# Patient Record
Sex: MALE | Race: WHITE | NOT HISPANIC OR LATINO | URBAN - METROPOLITAN AREA
[De-identification: names, ages, dates, MRNs, and addresses within clinical notes are randomized per-mention and may not be internally consistent; named-entity substitution may affect disease eponyms.]

---

## 2020-08-24 ENCOUNTER — EMERGENCY (EMERGENCY)
Facility: HOSPITAL | Age: 35
LOS: 1 days | Discharge: HOME | End: 2020-08-24
Attending: EMERGENCY MEDICINE | Admitting: EMERGENCY MEDICINE
Payer: SELF-PAY

## 2020-08-24 VITALS
SYSTOLIC BLOOD PRESSURE: 129 MMHG | TEMPERATURE: 98 F | RESPIRATION RATE: 18 BRPM | OXYGEN SATURATION: 97 % | DIASTOLIC BLOOD PRESSURE: 85 MMHG | HEART RATE: 91 BPM

## 2020-08-24 VITALS
SYSTOLIC BLOOD PRESSURE: 161 MMHG | HEART RATE: 92 BPM | TEMPERATURE: 99 F | OXYGEN SATURATION: 100 % | RESPIRATION RATE: 18 BRPM | DIASTOLIC BLOOD PRESSURE: 98 MMHG

## 2020-08-24 PROCEDURE — 71045 X-RAY EXAM CHEST 1 VIEW: CPT | Mod: 26

## 2020-08-24 PROCEDURE — 99283 EMERGENCY DEPT VISIT LOW MDM: CPT

## 2020-08-24 PROCEDURE — 73030 X-RAY EXAM OF SHOULDER: CPT | Mod: 26,LT

## 2020-08-24 RX ORDER — IBUPROFEN 200 MG
600 TABLET ORAL ONCE
Refills: 0 | Status: COMPLETED | OUTPATIENT
Start: 2020-08-24 | End: 2020-08-24

## 2020-08-24 RX ORDER — IBUPROFEN 200 MG
1 TABLET ORAL
Qty: 30 | Refills: 0
Start: 2020-08-24

## 2020-08-24 RX ORDER — OXYCODONE AND ACETAMINOPHEN 5; 325 MG/1; MG/1
1 TABLET ORAL ONCE
Refills: 0 | Status: DISCONTINUED | OUTPATIENT
Start: 2020-08-24 | End: 2020-08-24

## 2020-08-24 RX ORDER — OXYCODONE AND ACETAMINOPHEN 5; 325 MG/1; MG/1
1 TABLET ORAL
Qty: 9 | Refills: 0
Start: 2020-08-24

## 2020-08-24 RX ADMIN — OXYCODONE AND ACETAMINOPHEN 1 TABLET(S): 5; 325 TABLET ORAL at 17:17

## 2020-08-24 RX ADMIN — Medication 600 MILLIGRAM(S): at 17:17

## 2020-08-24 NOTE — ED PROVIDER NOTE - ENMT, MLM
Airway patent, Nasal mucosa clear. Mouth with normal mucosa. Throat has no vesicles, no oropharyngeal exudates and uvula is midline. laceration to left head
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
## 2020-08-24 NOTE — ED PROVIDER NOTE - PATIENT PORTAL LINK FT
You can access the FollowMyHealth Patient Portal offered by [facility] by registering at the following website: [email or web address]. By joining [employer]’s FollowMyHealth portal, you will also be able to view your health information using other applications (apps) compatible with our system.
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
## 2021-04-05 ENCOUNTER — APPOINTMENT (OUTPATIENT)
Dept: PULMONOLOGY | Facility: CLINIC | Age: 36
End: 2021-04-05
Payer: COMMERCIAL

## 2021-04-05 ENCOUNTER — NON-APPOINTMENT (OUTPATIENT)
Age: 36
End: 2021-04-05

## 2021-04-05 VITALS
WEIGHT: 250 LBS | TEMPERATURE: 98.1 F | HEART RATE: 76 BPM | OXYGEN SATURATION: 96 % | BODY MASS INDEX: 33.86 KG/M2 | DIASTOLIC BLOOD PRESSURE: 75 MMHG | SYSTOLIC BLOOD PRESSURE: 121 MMHG | HEIGHT: 72 IN

## 2021-04-05 DIAGNOSIS — J20.9 ACUTE BRONCHITIS, UNSPECIFIED: ICD-10-CM

## 2021-04-05 DIAGNOSIS — Z00.00 ENCOUNTER FOR GENERAL ADULT MEDICAL EXAMINATION W/OUT ABNORMAL FINDINGS: ICD-10-CM

## 2021-04-05 DIAGNOSIS — R05 COUGH: ICD-10-CM

## 2021-04-05 PROBLEM — Z78.9 OTHER SPECIFIED HEALTH STATUS: Chronic | Status: ACTIVE | Noted: 2020-08-24

## 2021-04-05 PROCEDURE — 94010 BREATHING CAPACITY TEST: CPT

## 2021-04-05 PROCEDURE — 95012 NITRIC OXIDE EXP GAS DETER: CPT

## 2021-04-05 PROCEDURE — ZZZZZ: CPT

## 2021-04-05 PROCEDURE — 99204 OFFICE O/P NEW MOD 45 MIN: CPT | Mod: 25

## 2021-04-05 PROCEDURE — 99072 ADDL SUPL MATRL&STAF TM PHE: CPT

## 2021-04-05 RX ORDER — AZITHROMYCIN DIHYDRATE 500 MG/1
500 TABLET, FILM COATED ORAL
Refills: 0 | Status: ACTIVE | COMMUNITY

## 2021-04-05 RX ORDER — PROMETHAZINE HYDROCHLORIDE AND DEXTROMETHORPHAN HYDROBROMIDE ORAL SOLUTION 15; 6.25 MG/5ML; MG/5ML
6.25-15 SOLUTION ORAL EVERY 6 HOURS
Qty: 300 | Refills: 0 | Status: ACTIVE | COMMUNITY
Start: 2021-04-05 | End: 1900-01-01

## 2021-04-05 NOTE — PHYSICAL EXAM
[No Acute Distress] : no acute distress [Normal Oropharynx] : normal oropharynx [Normal Appearance] : normal appearance [No Neck Mass] : no neck mass [Normal Rate/Rhythm] : normal rate/rhythm [Normal S1, S2] : normal s1, s2 [No Murmurs] : no murmurs [Wheeze] : wheeze [No Abnormalities] : no abnormalities [Benign] : benign [Normal Gait] : normal gait [No Clubbing] : no clubbing [No Cyanosis] : no cyanosis [No Edema] : no edema [FROM] : FROM [Normal Color/ Pigmentation] : normal color/ pigmentation [No Focal Deficits] : no focal deficits [Oriented x3] : oriented x3 [Normal Affect] : normal affect [TextBox_68] : Diffuse wheezing noted

## 2021-04-05 NOTE — ASSESSMENT
[FreeTextEntry1] : Cough and wheezing associated with negative chest x-ray and normal spirometry.  Has remote history of childhood asthma but PFTs do not support an asthma diagnosis.  Suspect this is an acute bronchitis which should resolve with time and cough suppressants.  Would not give additional steroids.

## 2021-04-05 NOTE — HISTORY OF PRESENT ILLNESS
[Never] : never [TextBox_4] : cough and wheezing\par took steroids and antibiotics\par CXR done this AM reported as negative.  Reports history of childhood asthma but no asthma episodes since that time.  Non-smoker denies occupational exposure to dust fumes or toxins.\par \par

## 2021-12-03 ENCOUNTER — EMERGENCY (EMERGENCY)
Facility: HOSPITAL | Age: 36
LOS: 1 days | Discharge: HOME | End: 2021-12-03
Attending: STUDENT IN AN ORGANIZED HEALTH CARE EDUCATION/TRAINING PROGRAM
Payer: COMMERCIAL

## 2021-12-03 PROCEDURE — 26770 TREAT FINGER DISLOCATION: CPT | Mod: 54

## 2021-12-03 PROCEDURE — 99284 EMERGENCY DEPT VISIT MOD MDM: CPT | Mod: 57

## 2021-12-04 VITALS
OXYGEN SATURATION: 100 % | HEART RATE: 68 BPM | TEMPERATURE: 98 F | SYSTOLIC BLOOD PRESSURE: 123 MMHG | RESPIRATION RATE: 16 BRPM | WEIGHT: 244.93 LBS | HEIGHT: 72 IN | DIASTOLIC BLOOD PRESSURE: 73 MMHG

## 2021-12-04 PROCEDURE — 73130 X-RAY EXAM OF HAND: CPT | Mod: 26,LT

## 2021-12-04 PROCEDURE — 73130 X-RAY EXAM OF HAND: CPT

## 2021-12-04 PROCEDURE — 99285 EMERGENCY DEPT VISIT HI MDM: CPT | Mod: 25

## 2021-12-04 PROCEDURE — 26770 TREAT FINGER DISLOCATION: CPT | Mod: LT

## 2021-12-04 NOTE — ED PROVIDER NOTE - OBJECTIVE STATEMENT
36 y.o presenting with pain to left 4th digit s/p fall today. denies other area of injury. endorses that he is unable to extend his finger. denies numbness, tingling, weakness

## 2021-12-04 NOTE — ED PROVIDER NOTE - PROGRESS NOTE DETAILS
Patient finger reduced, placed in splint, neurovascularly intact. given hand f.u, return precaution instructed.

## 2021-12-04 NOTE — ED ADULT NURSE NOTE - HIV OFFER
Patient called to confirm appointment.      CLINICAL PHARMACY CONSULT: MED RECONCILIATION/REVIEW ADDENDUM    For Pharmacy Admin Tracking Only    PHSO: No  Total # of Interventions Recommended: 0  Total Interventions Accepted:   Time Spent (min): 5    Duane Lugo PharmD Opt out

## 2021-12-04 NOTE — ED PROVIDER NOTE - PATIENT PORTAL LINK FT
You can access the FollowMyHealth Patient Portal offered by [facility] by registering at the following website: [email or web address]. By joining [employer]’s FollowMyHealth portal, you will also be able to view your health information using other applications (apps) compatible with our system.

## 2022-04-01 ENCOUNTER — EMERGENCY (EMERGENCY)
Facility: HOSPITAL | Age: 37
LOS: 1 days | Discharge: HOME | End: 2022-04-01
Attending: EMERGENCY MEDICINE
Payer: COMMERCIAL

## 2022-04-01 VITALS
RESPIRATION RATE: 17 BRPM | SYSTOLIC BLOOD PRESSURE: 150 MMHG | HEART RATE: 76 BPM | TEMPERATURE: 98 F | HEIGHT: 72 IN | WEIGHT: 244.93 LBS | DIASTOLIC BLOOD PRESSURE: 85 MMHG | OXYGEN SATURATION: 98 %

## 2022-04-01 VITALS
SYSTOLIC BLOOD PRESSURE: 152 MMHG | RESPIRATION RATE: 18 BRPM | OXYGEN SATURATION: 97 % | HEART RATE: 75 BPM | TEMPERATURE: 98 F | DIASTOLIC BLOOD PRESSURE: 93 MMHG

## 2022-04-01 PROCEDURE — 73060 X-RAY EXAM OF HUMERUS: CPT | Mod: 26,RT

## 2022-04-01 PROCEDURE — 99284 EMERGENCY DEPT VISIT MOD MDM: CPT | Mod: 25

## 2022-04-01 PROCEDURE — 73060 X-RAY EXAM OF HUMERUS: CPT

## 2022-04-01 PROCEDURE — 73090 X-RAY EXAM OF FOREARM: CPT

## 2022-04-01 PROCEDURE — 99284 EMERGENCY DEPT VISIT MOD MDM: CPT

## 2022-04-01 PROCEDURE — 73090 X-RAY EXAM OF FOREARM: CPT | Mod: 26,LT

## 2022-04-01 RX ORDER — ACETAMINOPHEN 500 MG
975 TABLET ORAL ONCE
Refills: 0 | Status: COMPLETED | OUTPATIENT
Start: 2022-04-01 | End: 2022-04-01

## 2022-04-01 RX ADMIN — Medication 975 MILLIGRAM(S): at 11:02

## 2022-04-01 NOTE — ED PROVIDER NOTE - NSFOLLOWUPINSTRUCTIONS_ED_ALL_ED_FT
Keep sling in place at all times.  Take 975mg of Tylenol every 6hrs and 600mg Ibuprofen (Advil/Motrin) every 8 hrs as needed for pains/fevers.  Drink plenty of fluids.  Follow up with Orthopedist or in the Clinic as discussed within 1 week.  Return to the ER for any concerns.

## 2022-04-01 NOTE — ED PROVIDER NOTE - NSFOLLOWUPCLINICS_GEN_ALL_ED_FT
[facility]  Orthopedics  [address] [place] [address]  Phone: [phone]  Fax: [phone]
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
## 2022-04-01 NOTE — ED PROVIDER NOTE - CARE PROVIDER_API CALL
Edson Abreu)  Orthopaedic Surgery; Sports Medicine  [address] [place] [address], [address] [place] [address]  Phone: [phone]  Fax: [phone]  Follow Up Time:

## 2022-04-01 NOTE — ED PROVIDER NOTE - PATIENT PORTAL LINK FT
You can access the FollowMyHealth Patient Portal offered by [facility] by registering at the following website: [email or web address]. By joining [employer]’s FollowMyHealth portal, you will also be able to view your health information using other applications (apps) compatible with our system.

## 2024-03-04 ENCOUNTER — EMERGENCY (EMERGENCY)
Facility: HOSPITAL | Age: 39
LOS: 1 days | Discharge: HOME | End: 2024-03-04
Attending: EMERGENCY MEDICINE
Payer: COMMERCIAL

## 2024-03-04 VITALS
SYSTOLIC BLOOD PRESSURE: 144 MMHG | DIASTOLIC BLOOD PRESSURE: 84 MMHG | WEIGHT: 220.02 LBS | HEIGHT: 71 IN | HEART RATE: 80 BPM | RESPIRATION RATE: 17 BRPM | TEMPERATURE: 98 F | OXYGEN SATURATION: 98 %

## 2024-03-04 VITALS
TEMPERATURE: 99 F | OXYGEN SATURATION: 98 % | SYSTOLIC BLOOD PRESSURE: 136 MMHG | DIASTOLIC BLOOD PRESSURE: 75 MMHG | HEART RATE: 84 BPM | RESPIRATION RATE: 17 BRPM

## 2024-03-04 LAB
ALBUMIN SERPL ELPH-MCNC: 4 G/DL — SIGNIFICANT CHANGE UP (ref 3.3–5)
ALP SERPL-CCNC: 56 U/L — SIGNIFICANT CHANGE UP (ref 40–120)
ALT FLD-CCNC: 23 U/L — SIGNIFICANT CHANGE UP (ref 10–45)
ANION GAP SERPL CALC-SCNC: 10 MMOL/L — SIGNIFICANT CHANGE UP (ref 5–17)
APTT BLD: 28.4 SEC — SIGNIFICANT CHANGE UP (ref 24.5–35.6)
AST SERPL-CCNC: 25 U/L — SIGNIFICANT CHANGE UP (ref 10–40)
BASE EXCESS BLDV CALC-SCNC: -2.3 MMOL/L — LOW (ref -2–3)
BASOPHILS # BLD AUTO: 0.04 K/UL — SIGNIFICANT CHANGE UP (ref 0–0.2)
BASOPHILS NFR BLD AUTO: 0.3 % — SIGNIFICANT CHANGE UP (ref 0–2)
BILIRUB SERPL-MCNC: 0.7 MG/DL — SIGNIFICANT CHANGE UP (ref 0.2–1.2)
BUN SERPL-MCNC: 19 MG/DL — SIGNIFICANT CHANGE UP (ref 7–23)
CA-I SERPL-SCNC: 1.17 MMOL/L — SIGNIFICANT CHANGE UP (ref 1.15–1.33)
CALCIUM SERPL-MCNC: 8.8 MG/DL — SIGNIFICANT CHANGE UP (ref 8.4–10.5)
CHLORIDE BLDV-SCNC: 102 MMOL/L — SIGNIFICANT CHANGE UP (ref 96–108)
CHLORIDE SERPL-SCNC: 104 MMOL/L — SIGNIFICANT CHANGE UP (ref 96–108)
CO2 BLDV-SCNC: 26 MMOL/L — SIGNIFICANT CHANGE UP (ref 22–26)
CO2 SERPL-SCNC: 22 MMOL/L — SIGNIFICANT CHANGE UP (ref 22–31)
CREAT SERPL-MCNC: 0.78 MG/DL — SIGNIFICANT CHANGE UP (ref 0.5–1.3)
EGFR: 116 ML/MIN/1.73M2 — SIGNIFICANT CHANGE UP
EOSINOPHIL # BLD AUTO: 0.16 K/UL — SIGNIFICANT CHANGE UP (ref 0–0.5)
EOSINOPHIL NFR BLD AUTO: 1.2 % — SIGNIFICANT CHANGE UP (ref 0–6)
GAS PNL BLDV: 133 MMOL/L — LOW (ref 136–145)
GAS PNL BLDV: SIGNIFICANT CHANGE UP
GLUCOSE BLDV-MCNC: 100 MG/DL — HIGH (ref 70–99)
GLUCOSE SERPL-MCNC: 101 MG/DL — HIGH (ref 70–99)
HCO3 BLDV-SCNC: 25 MMOL/L — SIGNIFICANT CHANGE UP (ref 22–29)
HCT VFR BLD CALC: 42 % — SIGNIFICANT CHANGE UP (ref 39–50)
HCT VFR BLDA CALC: 52 % — HIGH (ref 39–51)
HGB BLD CALC-MCNC: 17.3 G/DL — SIGNIFICANT CHANGE UP (ref 12.6–17.4)
HGB BLD-MCNC: 15 G/DL — SIGNIFICANT CHANGE UP (ref 13–17)
IMM GRANULOCYTES NFR BLD AUTO: 0.4 % — SIGNIFICANT CHANGE UP (ref 0–0.9)
INR BLD: 1 RATIO — SIGNIFICANT CHANGE UP (ref 0.85–1.18)
LACTATE BLDV-MCNC: 1.9 MMOL/L — SIGNIFICANT CHANGE UP (ref 0.5–2)
LIDOCAIN IGE QN: 22 U/L — SIGNIFICANT CHANGE UP (ref 7–60)
LYMPHOCYTES # BLD AUTO: 0.76 K/UL — LOW (ref 1–3.3)
LYMPHOCYTES # BLD AUTO: 5.5 % — LOW (ref 13–44)
MCHC RBC-ENTMCNC: 30.2 PG — SIGNIFICANT CHANGE UP (ref 27–34)
MCHC RBC-ENTMCNC: 35.7 GM/DL — SIGNIFICANT CHANGE UP (ref 32–36)
MCV RBC AUTO: 84.5 FL — SIGNIFICANT CHANGE UP (ref 80–100)
MONOCYTES # BLD AUTO: 0.73 K/UL — SIGNIFICANT CHANGE UP (ref 0–0.9)
MONOCYTES NFR BLD AUTO: 5.3 % — SIGNIFICANT CHANGE UP (ref 2–14)
NEUTROPHILS # BLD AUTO: 12.03 K/UL — HIGH (ref 1.8–7.4)
NEUTROPHILS NFR BLD AUTO: 87.3 % — HIGH (ref 43–77)
NRBC # BLD: 0 /100 WBCS — SIGNIFICANT CHANGE UP (ref 0–0)
PCO2 BLDV: 49 MMHG — SIGNIFICANT CHANGE UP (ref 42–55)
PH BLDV: 7.31 — LOW (ref 7.32–7.43)
PLATELET # BLD AUTO: 257 K/UL — SIGNIFICANT CHANGE UP (ref 150–400)
PO2 BLDV: 35 MMHG — SIGNIFICANT CHANGE UP (ref 25–45)
POTASSIUM BLDV-SCNC: 3.9 MMOL/L — SIGNIFICANT CHANGE UP (ref 3.5–5.1)
POTASSIUM SERPL-MCNC: 3.9 MMOL/L — SIGNIFICANT CHANGE UP (ref 3.5–5.3)
POTASSIUM SERPL-SCNC: 3.9 MMOL/L — SIGNIFICANT CHANGE UP (ref 3.5–5.3)
PROT SERPL-MCNC: 6.8 G/DL — SIGNIFICANT CHANGE UP (ref 6–8.3)
PROTHROM AB SERPL-ACNC: 10.5 SEC — SIGNIFICANT CHANGE UP (ref 9.5–13)
RBC # BLD: 4.97 M/UL — SIGNIFICANT CHANGE UP (ref 4.2–5.8)
RBC # FLD: 12 % — SIGNIFICANT CHANGE UP (ref 10.3–14.5)
SAO2 % BLDV: 56.6 % — LOW (ref 67–88)
SODIUM SERPL-SCNC: 136 MMOL/L — SIGNIFICANT CHANGE UP (ref 135–145)
WBC # BLD: 13.78 K/UL — HIGH (ref 3.8–10.5)
WBC # FLD AUTO: 13.78 K/UL — HIGH (ref 3.8–10.5)

## 2024-03-04 PROCEDURE — 96366 THER/PROPH/DIAG IV INF ADDON: CPT

## 2024-03-04 PROCEDURE — 80053 COMPREHEN METABOLIC PANEL: CPT

## 2024-03-04 PROCEDURE — 85018 HEMOGLOBIN: CPT

## 2024-03-04 PROCEDURE — 82330 ASSAY OF CALCIUM: CPT

## 2024-03-04 PROCEDURE — 82435 ASSAY OF BLOOD CHLORIDE: CPT

## 2024-03-04 PROCEDURE — 96365 THER/PROPH/DIAG IV INF INIT: CPT | Mod: XU

## 2024-03-04 PROCEDURE — 82803 BLOOD GASES ANY COMBINATION: CPT

## 2024-03-04 PROCEDURE — 71045 X-RAY EXAM CHEST 1 VIEW: CPT

## 2024-03-04 PROCEDURE — 84295 ASSAY OF SERUM SODIUM: CPT

## 2024-03-04 PROCEDURE — 82947 ASSAY GLUCOSE BLOOD QUANT: CPT

## 2024-03-04 PROCEDURE — 71275 CT ANGIOGRAPHY CHEST: CPT | Mod: 26,MC

## 2024-03-04 PROCEDURE — 71275 CT ANGIOGRAPHY CHEST: CPT | Mod: MC

## 2024-03-04 PROCEDURE — 71045 X-RAY EXAM CHEST 1 VIEW: CPT | Mod: 26

## 2024-03-04 PROCEDURE — 96375 TX/PRO/DX INJ NEW DRUG ADDON: CPT | Mod: XU

## 2024-03-04 PROCEDURE — 74177 CT ABD & PELVIS W/CONTRAST: CPT | Mod: 26,MC

## 2024-03-04 PROCEDURE — 72131 CT LUMBAR SPINE W/O DYE: CPT | Mod: 26,MC

## 2024-03-04 PROCEDURE — 84132 ASSAY OF SERUM POTASSIUM: CPT

## 2024-03-04 PROCEDURE — 85025 COMPLETE CBC W/AUTO DIFF WBC: CPT

## 2024-03-04 PROCEDURE — 85014 HEMATOCRIT: CPT

## 2024-03-04 PROCEDURE — 83605 ASSAY OF LACTIC ACID: CPT

## 2024-03-04 PROCEDURE — 36415 COLL VENOUS BLD VENIPUNCTURE: CPT

## 2024-03-04 PROCEDURE — 83690 ASSAY OF LIPASE: CPT

## 2024-03-04 PROCEDURE — 85610 PROTHROMBIN TIME: CPT

## 2024-03-04 PROCEDURE — 74177 CT ABD & PELVIS W/CONTRAST: CPT | Mod: MC

## 2024-03-04 PROCEDURE — 99285 EMERGENCY DEPT VISIT HI MDM: CPT

## 2024-03-04 PROCEDURE — 99284 EMERGENCY DEPT VISIT MOD MDM: CPT | Mod: 25

## 2024-03-04 PROCEDURE — 85730 THROMBOPLASTIN TIME PARTIAL: CPT

## 2024-03-04 RX ORDER — CYCLOBENZAPRINE HYDROCHLORIDE 10 MG/1
1 TABLET, FILM COATED ORAL
Qty: 15 | Refills: 0
Start: 2024-03-04

## 2024-03-04 RX ORDER — ACETAMINOPHEN 500 MG
1000 TABLET ORAL ONCE
Refills: 0 | Status: COMPLETED | OUTPATIENT
Start: 2024-03-04 | End: 2024-03-04

## 2024-03-04 RX ORDER — CYCLOBENZAPRINE HYDROCHLORIDE 10 MG/1
10 TABLET, FILM COATED ORAL ONCE
Refills: 0 | Status: COMPLETED | OUTPATIENT
Start: 2024-03-04 | End: 2024-03-04

## 2024-03-04 RX ORDER — ONDANSETRON 8 MG/1
4 TABLET, FILM COATED ORAL ONCE
Refills: 0 | Status: COMPLETED | OUTPATIENT
Start: 2024-03-04 | End: 2024-03-04

## 2024-03-04 RX ORDER — SODIUM CHLORIDE 9 MG/ML
1000 INJECTION INTRAMUSCULAR; INTRAVENOUS; SUBCUTANEOUS ONCE
Refills: 0 | Status: COMPLETED | OUTPATIENT
Start: 2024-03-04 | End: 2024-03-04

## 2024-03-04 RX ORDER — LIDOCAINE 4 G/100G
1 CREAM TOPICAL ONCE
Refills: 0 | Status: COMPLETED | OUTPATIENT
Start: 2024-03-04 | End: 2024-03-04

## 2024-03-04 RX ORDER — IBUPROFEN 200 MG
600 TABLET ORAL ONCE
Refills: 0 | Status: COMPLETED | OUTPATIENT
Start: 2024-03-04 | End: 2024-03-04

## 2024-03-04 RX ADMIN — Medication 1000 MILLIGRAM(S): at 14:11

## 2024-03-04 RX ADMIN — Medication 400 MILLIGRAM(S): at 12:33

## 2024-03-04 RX ADMIN — CYCLOBENZAPRINE HYDROCHLORIDE 10 MILLIGRAM(S): 10 TABLET, FILM COATED ORAL at 16:50

## 2024-03-04 RX ADMIN — Medication 600 MILLIGRAM(S): at 16:51

## 2024-03-04 RX ADMIN — ONDANSETRON 4 MILLIGRAM(S): 8 TABLET, FILM COATED ORAL at 12:33

## 2024-03-04 RX ADMIN — SODIUM CHLORIDE 1000 MILLILITER(S): 9 INJECTION INTRAMUSCULAR; INTRAVENOUS; SUBCUTANEOUS at 12:34

## 2024-03-04 RX ADMIN — LIDOCAINE 1 PATCH: 4 CREAM TOPICAL at 16:56

## 2024-03-04 NOTE — ED PROVIDER NOTE - CLINICAL SUMMARY MEDICAL DECISION MAKING FREE TEXT BOX
The patient currently reports abdominal and back pain. Based upon the history and exam, the patient requires CT angiogram imaging of the chest, abdomen, and pelvis. They will continue to be monitored with any changes to clinical plan documented in progress notes. The patient is currently reporting pain that will be addressed with appropriate medication & therapeutics and will require reassessment.

## 2024-03-04 NOTE — ED PROVIDER NOTE - PHYSICAL EXAMINATION
T(F): 97.8; HR: 80; BP: 144/84; RR: 17; SpO2: 98%    General: uncomfortable; alert, oriented to person, time, place  Psych: mood appropriate  Head: normocephalic; atraumatic  Eyes: conjunctivae clear bilaterally, sclerae anicteric  ENT: no nasal flaring, patent nares  Cardio: non-tachycardic; skin warm and well perfused  Resp: normal respiratory effort; no accessory muscle use  GI: guarding, tender with light palpation diffusely  Neuro: normal sensation, moving all four extremities equally  Skin: No evidence of rash or bruising  MSK: midline lumbar tenderness to palpation  Lymph/Vasc: no LE edema

## 2024-03-04 NOTE — ED ADULT NURSE NOTE - OBJECTIVE STATEMENT
Patient presents to Ed via amb with c/o back pain. Patient reports experiencing onset of severe back pain at 3am today.   Per Ems Morphine 5mg and Zofran 4mg given en route via 18g LAC iv lock. Patient presents to Ed via amb with c/o back pain. Patient reports experiencing onset of severe back pain at 3am today.   Per Ems Morphine 5mg and Zofran 4mg given en route via 18g LAC iv lock. Patient A&Ox3 denies chest pain or sob no   nausea vomiting cough fever chills headache or dizziness denies urinary abnormal symptoms. Patient awaiting disposition  comfort measures provided. Patient presents to Ed via amb with c/o back pain. Patient reports experiencing onset of severe back pain at 3am today.   Per Ems Morphine 5mg and Zofran 4mg given en route via 18g LAC iv lock. Patient A&Ox3 denies chest pain or sob no   nausea vomiting cough fever chills headache or dizziness denies urinary abnormal symptoms. Patient awaiting disposition  comfort measures provided.  Addendum to previous note patient c/o nausea on assessment antiemetic given.

## 2024-03-04 NOTE — ED PROVIDER NOTE - NSFOLLOWUPINSTRUCTIONS_ED_ALL_ED_FT
You were seen in the emergency department for back pain. We have evaluated you and determined that you do not require further hospital interventions.    During your stay you had the following relevant results: a CT scan showing "L4-5 suspected broad-based disc bulge with suspected impingement of exiting left L4 nerve root. Moderate right-sided neural foraminal stenosis"    Please follow up with a SPINE DOCTOR to discuss the results of your stay in our department.    You have been sent one or more prescriptions to your pharmacy. The dosing and frequency are included in this paperwork. Please take each medication as instructed.    If you start to experience worsening symptoms such as inability to walk, fevers or chills, severe pain, please return to the emergency department for further evaluation.

## 2024-03-04 NOTE — ED ADULT TRIAGE NOTE - WEIGHT METHOD
Ambulatory Care Coordination Note  1/6/2022  CM Risk Score: 0  Charlson 10 Year Mortality Risk Score: 2%     ACC: Dwayne Corona RN    Summary Note: Called patient's mother to follow up on progress, reinforce previous education/ provide pt education, and discuss any new issues or concerns. HIPAA compliant message left requesting a return phone call at patients convenience to discuss. Will continue to follow. Care Coordination Interventions    Program Enrollment: Rising Risk  Referral from Primary Care Provider: No  Suggested Interventions and Community Resources  Behavorial Health: In Process         Goals Addressed    None         Prior to Admission medications    Medication Sig Start Date End Date Taking? Authorizing Provider   FLUoxetine (PROZAC) 20 MG capsule  9/28/21   Historical Provider, MD   QUEtiapine (SEROQUEL) 25 MG tablet Take 50 mg by mouth nightly    Historical Provider, MD       No future appointments. stated

## 2024-03-04 NOTE — ED ADULT TRIAGE NOTE - NSWEIGHTCALCTOOLDRUG_GEN_A_CORE
used Keystone Flap Text: The defect edges were debeveled with a #15 scalpel blade.  Given the location of the defect, shape of the defect a keystone flap was deemed most appropriate.  Using a sterile surgical marker, an appropriate keystone flap was drawn incorporating the defect, outlining the appropriate donor tissue and placing the expected incisions within the relaxed skin tension lines where possible. The area thus outlined was incised deep to adipose tissue with a #15 scalpel blade.  The skin margins were undermined to an appropriate distance in all directions around the primary defect and laterally outward around the flap utilizing iris scissors.

## 2024-03-04 NOTE — ED ADULT NURSE NOTE - NSFALLRISKINTERV_ED_ALL_ED

## 2024-03-04 NOTE — ED PROVIDER NOTE - MDM ORDERS SUBMITTED SELECTION
Rx Authorization:    Requested Medication/ Dosedonepezil (ARICEPT) 5 MG tablet    Date last refill ordered: 4/2/21    Quantity ordered: 30    # refills: 2    Date of last clinic visit with ordering provider: 6/23/21    Date of next clinic visit with ordering provider:     All pertinent protocol data (lab date/result):     Include pertinent information from patients message:     
Imaging Studies/Medications

## 2024-03-04 NOTE — ED PROVIDER NOTE - NS ED MD DISPO DISCHARGE CCDA
Addended by: ROSELIA SHERWOOD on: 10/5/2021 08:02 PM     Modules accepted: Orders     Patient/Caregiver provided printed discharge information.

## 2024-03-04 NOTE — ED PROVIDER NOTE - PATIENT PORTAL LINK FT
You can access the FollowMyHealth Patient Portal offered by [facility] by registering at the following website: [email or web address]. By joining [employer]’s FollowMyHealth portal, you will also be able to view your health information using other applications (apps) compatible with our system.

## 2024-03-04 NOTE — ED PROVIDER NOTE - OBJECTIVE STATEMENT
38-year-old male with no past medical history presents to the emergency department due to acute onset back and upper abdominal pain beginning at 3 AM.  He called 911, EMS gave him 5 mg of morphine with partial effect.  He denies shortness of breath.  He has no recent injections to the back.  No recent traumatic events.  He also endorses nausea. Only surgical history is biceps rupture repair.

## 2024-03-04 NOTE — ED ADULT NURSE NOTE - NSFALLRISKASMTTYPE_ED_ALL_ED
[facility]    History and Physical - Hospitalist Service       Date of Admission:  6/21/2019    Assessment & Plan   Ras Esparza is a 60 year old male who has not seen primary care physician for routine checkup for several years and so is not aware of any medical problems who was sent to emergency room by his physician because of abnormal creatinine when he presented to his primary care provider yesterday due to worsening lower extremity edema and intermittent back pain.    Acute kidney injury  Bladder outlet obstruction with bilateral hydronephrosis and hydroureter  Elevated PSA  -Elevated creatinine likely secondary to obstruction, which has now been relieved, will get urology evaluate him, however due to the degree of renal failure we will also get nephrology evaluate him  -Monitor daily BMP, avoid any nephrotoxin, hydrate gently    Back pain  -Could be referred pain from distended bladder, however given his elevated PSA and possibility of prostate cancer with metastasis to spine will get x-ray to screen, pain management with PRN [place]    Lower extremity edema  Dyspnea on exertion  Elevated BNP  -Likely from renal failure, however given small pleural effusion, elevated BNP and worsening dyspnea we will get echocardiogram   -also will get lower extremity ultrasound to rule out DVT    Essential Hypertension   -Likely undiagnosed in the setting of not seeing a physician for several years.  Not on any medication PTA  -PRN IV hydralazine and labetalol  -Monitor while in-house, likely will start him on antihypertensive depending on his trend over the next 24 hours    Anemia  -Does complain of intermittent hemorrhoidal bleeding.  Monitor and transfuse as needed       Diet: Renal  DVT Prophylaxis: Pneumatic Compression Devices  Mayberry Catheter: in place, indication:  Bladder outlet obstruction  Code Status: Full code    Disposition Plan   Expected discharge: 2 - 3 days, recommended to prior living  arrangement once renal function improved and And cleared by all consultants.  Entered: Shantel Worthington MD 06/21/2019, 1:35 PM     The patient's care was discussed with the Bedside Nurse, Patient and Patient's Family.    Shantel Worthington MD  [facility]    ______________________________________________________________________    Chief Complaint   Lower extremity swelling, abnormal labs    History is obtained from the patient and patient's family    History of Present Illness   Ras Esparza is a 60 year old male who has not seen primary care physician for routine checkup for several years and so is not aware of any medical problems who was sent to emergency room by his physician because of abnormal creatinine when he presented to his primary care provider yesterday due to worsening lower extremity edema and intermittent back pain.When he went to his PCP yesterday mainly because his lower extremity started swelling, and labs were drawn and he was found to have a creatinine of 9.59 with a BUN of 98, proBNP 45 492, PSA 15 TSH 0.85 hemoglobin 8.7 and hematocrit 27.9.  Results came back today and so he was advised to come to the ED.     Patient reports that his symptoms have been ongoing for several months now.  He had been noticing poor streaming of urine for several months now associated with pain prior to urination which improves after he is able to void somewhat.  He denies any hematuria. Also he has noticed decreased urine quantity for last several weeks.   He was noted to have suprapubic mass when he presented to the ED, which he reports has been there for several months.  Bladder scan was done and he already has more than 3 L out after Mayberry catheter was placed.  He reports he is mass is gone right now and feels much more comfortable.  He has also been noticing bilateral lower extremity swelling for last several weeks.  He denies any pain in his legs.  He also noticed shortness of breath more than his  baseline.  He reports normally he can mow the lawn almost half of his two third of acre without stopping, however recently he has to stop every 10 minutes or so mainly for extreme fatigue and shortness of breath.  He denies any chest pain, palpitation, dizziness, lightheadedness, fever or cough.  Also he has been noticing pain in his lower back, mainly in the spine which does not radiate to his legs, however it comes to his abdomen at times.    When he presented to the ED he was found to have a blood pressure of 187/100, pulse rate 147, temperature 97.6, O2 sat 95% on room air.  Labs showed sodium of 142, potassium 4.7 chloride 110, BUN 96, creatinine 8.94 white count 6.9 hemoglobin is 8.9 hematocrit 27.7 platelet 228, UA with trace amount of leukocyte esterase and increased WBC, culture have been sent.CT abdomen and pelvis with bilateral hydronephrosis and hydroureter with no urinary tract calculi, this is likely chronic and related to bladder outlet obstruction.  Thick-walled decompressed urinary bladder with Mayberry catheter and vascular calcification.  Chest x-ray shows posterior small left pleural effusion remainder of the lungs are clear bilaterally heart size is probably normal considering AP technique     He does have history of hemorrhoidal bleeding.  He reports whenever he is constipated he has little amount of edgardo blood in the toilet paper when he wipes his bowel movement.  He also complains of poor appetite but denies nausea or vomiting.  He however reports to intentional weight loss with intermittent fasting for about 4 months.  He has lost close to 45 to 50 pounds in the last 4 to 5 months.    Review of Systems    The 10 point Review of Systems is negative other than noted in the HPI or here.     Past Medical History    I have reviewed this patient's medical history and updated it with pertinent information if needed.   Past Medical History:   Diagnosis Date     Tobacco abuse        Past Surgical  History   I have reviewed this patient's surgical history and updated it with pertinent information if needed.  No past surgical history on file.    Social History   I have reviewed this patient's social history and updated it with pertinent information if needed.  Social History     Tobacco Use     Smoking status: Former Smoker     Packs/day: 0.50     Types: Cigarettes     Last attempt to quit: 2019     Years since quittin.0     Smokeless tobacco: Never Used     Tobacco comment: 12 cigarettes per day   Substance Use Topics     Alcohol use: Not Currently     Alcohol/week: 0.0 oz     Comment: quit in 2018.      Drug use: Never       Family History   I have reviewed this patient's family history and updated it with pertinent information if needed.   Family History   Problem Relation Age of Onset     Glaucoma Mother      Throat cancer Father      Rheumatoid Arthritis Sister      Alcoholism Brother      Liver Disease Brother        Prior to Admission Medications   Prior to Admission Medications   Prescriptions Last Dose Informant Patient Reported? Taking?   ibuprofen (ADVIL/MOTRIN) 200 MG tablet PRN Self Yes Yes   Sig: Take 100-200 mg by mouth daily as needed for mild pain      Facility-Administered Medications: None     Allergies   No Known Allergies    Physical Exam   Vital Signs: Temp: 97.6  F (36.4  C) Temp src: Temporal BP: (!) 160/105 Pulse: 91   Resp: 20 SpO2: 100 % O2 Device: None (Room air)    Weight: 256 lbs 0 oz    Exam:  Constitutional: Awake, alert and no distress. Appears comfortable  Head: Normocephalic. No masses, lesions, tenderness or abnormalities  ENT: ENT exam normal, no neck nodes or sinus tenderness  Cardiovascular: RRR.  No murmurs, no rubs or JVD  Respiratory: Normal WOB,b/l equal air entry, no wheezes or crackles   Gastrointestinal: Abdomen soft, non-tender. BS normal. No masses, organomegaly  : Mayberry catheter in place with blood-tinged urine  Extremities : 3+ bilateral pitting edema  , no clubbing or cyanosis    Neurologic: Cranial nerves II-XII grossly intact , power symmetrical bilaterally, Reflexes normal and symmetric. Sensation grossly WNL.  Skin: Warm and dry to touch no rash    Data   Data reviewed today: I reviewed all medications, new labs and imaging results over the last 24 hours. I personally reviewed the abdominal CT image(s) showing Bilateral hydronephrosis and hydroureter, see below for details.    Recent Labs   Lab 06/21/19  1114 06/20/19  1651   WBC 6.9 8.0   HGB 8.9* 8.7*   MCV 93 96    227    141   POTASSIUM 4.7 5.0   CHLORIDE 110* 110*   CO2 18* 18*   BUN 96* 98*   CR 8.94* 9.59*   ANIONGAP 14 13   ELICIA 8.7 8.4*   GLC 95 100*   ALBUMIN 3.3* 3.5   PROTTOTAL 7.1 7.1   BILITOTAL 0.6 0.7   ALKPHOS 80 79   ALT 38 39   AST 15 18     Recent Results (from the past 24 hour(s))   Abd/pelvis CT no contrast - Stone Protocol    Narrative    CT ABDOMEN AND PELVIS WITHOUT CONTRAST 6/21/2019 12:06 PM    HISTORY: Elevated creatinine. Evaluate for mass or urinary retention.    TECHNIQUE: Helical axial scans from the dome of liver through the  pubic symphysis without contrast. Radiation dose for this scan was  reduced using automated exposure control, adjustment of the mA and/or  kV according to patient size, or iterative reconstruction technique.    COMPARISON: None.    FINDINGS: Small left pleural effusion with adjacent atelectatic  change. The liver, spleen, pancreas and bilateral adrenal glands  appear normal to the extent visualized without IV contrast. Vascular  calcifications are seen.    There is bilateral hydronephrosis and hydroureter. No urinary tract  calculi. Bilateral perinephric soft tissue stranding which is greater  on the left than the right and is likely related to chronic  obstruction. The bowel and mesentery in the upper abdomen are  unremarkable.    Scans through the pelvis show a Mayberry catheter in a decompressed  urinary bladder. Urinary bladder wall appears  thickened which may be  related to chronic bladder outlet obstruction. No free fluid or acute  appearing abnormality. The appendix is identified and appears normal.      Impression    IMPRESSION:  1. Bilateral hydronephrosis and hydroureter with no urinary tract  calculi. This is likely chronic and related to bladder outlet  obstruction.  2. Thick-walled decompressed urinary bladder with Mayberry catheter.  3. Vascular calcifications.   XR Chest 2 Views    Narrative    CHEST TWO VIEWS  6/21/2019 1:21 PM     HISTORY: RAMON (dyspnea on exertion).    COMPARISON: None.      Impression    IMPRESSION: Posterior small left pleural effusion. Remainder of the  lungs are clear bilaterally. Heart size is probably normal considering  AP technique.      Initial (On Arrival)

## 2024-03-04 NOTE — ED PROVIDER NOTE - PROGRESS NOTE DETAILS
Roberto Villaseñor MD: Lab and imaging results communicated to patient.  He has a herniated disc between L4 and L5.  Patient given lidocaine patch, cyclobenzaprine, Motrin.  He will be discharged with a prescription for cyclobenzaprine and a referral to spine.

## 2024-03-04 NOTE — ED PROVIDER NOTE - ATTENDING CONTRIBUTION TO CARE
39m no med hx pw back pain and chest pain since 3am  has not felt this before  given morphine in the ambulance  on exam, somnolent but responsive  consider dissection

## 2024-03-11 ENCOUNTER — APPOINTMENT (OUTPATIENT)
Dept: ORTHOPEDIC SURGERY | Facility: CLINIC | Age: 39
End: 2024-03-11

## 2024-11-05 NOTE — ED ADULT TRIAGE NOTE - NS ED TRIAGE AVPU SCALE
Alert-The patient is alert, awake and responds to voice. The patient is oriented to time, place, and person. The triage nurse is able to obtain subjective information. Detail Level: Zone Post-Care Instructions: I reviewed with the patient in detail post-care instructions. Patient is to wear sunprotection, and avoid picking at any of the treated lesions. Pt may apply Vaseline to crusted or scabbing areas. Consent: The patient's consent was obtained including but not limited to risks of crusting, scabbing, blistering, scarring, darker or lighter pigmentary change, recurrence, incomplete removal and infection. Render Post-Care Instructions In Note?: no Duration Of Freeze Thaw-Cycle (Seconds): 1 Total Number Of Aks Treated: 11 Number Of Freeze-Thaw Cycles: 2 freeze-thaw cycles Spray Paint Text: The liquid nitrogen was applied to the skin utilizing a spray paint frosting technique. Show Spray Paint Technique Variable?: Yes Number Of Freeze-Thaw Cycles: 3 freeze-thaw cycles Medical Necessity Information: It is in your best interest to select a reason for this procedure from the list below. All of these items fulfill various CMS LCD requirements except the new and changing color options. Duration Of Freeze Thaw-Cycle (Seconds): 5 Total Number Of Lesions Treated: 18 Medical Necessity Clause: This procedure was medically necessary because the lesions that were treated were: irritated Detail Level: Generalized

## 2025-06-02 ENCOUNTER — EMERGENCY (EMERGENCY)
Facility: HOSPITAL | Age: 40
LOS: 1 days | End: 2025-06-02
Attending: EMERGENCY MEDICINE
Payer: COMMERCIAL

## 2025-06-02 VITALS
DIASTOLIC BLOOD PRESSURE: 82 MMHG | RESPIRATION RATE: 18 BRPM | WEIGHT: 222.01 LBS | OXYGEN SATURATION: 100 % | SYSTOLIC BLOOD PRESSURE: 139 MMHG | HEART RATE: 87 BPM | TEMPERATURE: 98 F | HEIGHT: 72 IN

## 2025-06-02 LAB
ALBUMIN SERPL ELPH-MCNC: 3.8 G/DL — SIGNIFICANT CHANGE UP (ref 3.5–5)
ALP SERPL-CCNC: 75 U/L — SIGNIFICANT CHANGE UP (ref 40–120)
ALT FLD-CCNC: 46 U/L DA — SIGNIFICANT CHANGE UP (ref 10–60)
ANION GAP SERPL CALC-SCNC: 3 MMOL/L — LOW (ref 5–17)
AST SERPL-CCNC: 25 U/L — SIGNIFICANT CHANGE UP (ref 10–40)
BASOPHILS # BLD AUTO: 0.06 K/UL — SIGNIFICANT CHANGE UP (ref 0–0.2)
BASOPHILS NFR BLD AUTO: 0.4 % — SIGNIFICANT CHANGE UP (ref 0–2)
BILIRUB SERPL-MCNC: 1.3 MG/DL — HIGH (ref 0.2–1.2)
BUN SERPL-MCNC: 16 MG/DL — SIGNIFICANT CHANGE UP (ref 7–18)
CALCIUM SERPL-MCNC: 8.9 MG/DL — SIGNIFICANT CHANGE UP (ref 8.4–10.5)
CHLORIDE SERPL-SCNC: 103 MMOL/L — SIGNIFICANT CHANGE UP (ref 96–108)
CK SERPL-CCNC: 200 U/L — SIGNIFICANT CHANGE UP (ref 35–232)
CO2 SERPL-SCNC: 29 MMOL/L — SIGNIFICANT CHANGE UP (ref 22–31)
CREAT SERPL-MCNC: 1.04 MG/DL — SIGNIFICANT CHANGE UP (ref 0.5–1.3)
EGFR: 93 ML/MIN/1.73M2 — SIGNIFICANT CHANGE UP
EGFR: 93 ML/MIN/1.73M2 — SIGNIFICANT CHANGE UP
EOSINOPHIL # BLD AUTO: 0.22 K/UL — SIGNIFICANT CHANGE UP (ref 0–0.5)
EOSINOPHIL NFR BLD AUTO: 1.3 % — SIGNIFICANT CHANGE UP (ref 0–6)
GLUCOSE SERPL-MCNC: 95 MG/DL — SIGNIFICANT CHANGE UP (ref 70–99)
HCT VFR BLD CALC: 42.8 % — SIGNIFICANT CHANGE UP (ref 39–50)
HGB BLD-MCNC: 14.9 G/DL — SIGNIFICANT CHANGE UP (ref 13–17)
IMM GRANULOCYTES NFR BLD AUTO: 0.4 % — SIGNIFICANT CHANGE UP (ref 0–0.9)
LYMPHOCYTES # BLD AUTO: 16.8 % — SIGNIFICANT CHANGE UP (ref 13–44)
LYMPHOCYTES # BLD AUTO: 2.84 K/UL — SIGNIFICANT CHANGE UP (ref 1–3.3)
MCHC RBC-ENTMCNC: 29.6 PG — SIGNIFICANT CHANGE UP (ref 27–34)
MCHC RBC-ENTMCNC: 34.8 G/DL — SIGNIFICANT CHANGE UP (ref 32–36)
MCV RBC AUTO: 84.9 FL — SIGNIFICANT CHANGE UP (ref 80–100)
MONOCYTES # BLD AUTO: 1.42 K/UL — HIGH (ref 0–0.9)
MONOCYTES NFR BLD AUTO: 8.4 % — SIGNIFICANT CHANGE UP (ref 2–14)
NEUTROPHILS # BLD AUTO: 12.33 K/UL — HIGH (ref 1.8–7.4)
NEUTROPHILS NFR BLD AUTO: 72.7 % — SIGNIFICANT CHANGE UP (ref 43–77)
NRBC BLD AUTO-RTO: 0 /100 WBCS — SIGNIFICANT CHANGE UP (ref 0–0)
PLATELET # BLD AUTO: 246 K/UL — SIGNIFICANT CHANGE UP (ref 150–400)
POTASSIUM SERPL-MCNC: 4.5 MMOL/L — SIGNIFICANT CHANGE UP (ref 3.5–5.3)
POTASSIUM SERPL-SCNC: 4.5 MMOL/L — SIGNIFICANT CHANGE UP (ref 3.5–5.3)
PROT SERPL-MCNC: 7.6 G/DL — SIGNIFICANT CHANGE UP (ref 6–8.3)
RBC # BLD: 5.04 M/UL — SIGNIFICANT CHANGE UP (ref 4.2–5.8)
RBC # FLD: 12.1 % — SIGNIFICANT CHANGE UP (ref 10.3–14.5)
SODIUM SERPL-SCNC: 135 MMOL/L — SIGNIFICANT CHANGE UP (ref 135–145)
WBC # BLD: 16.94 K/UL — HIGH (ref 3.8–10.5)
WBC # FLD AUTO: 16.94 K/UL — HIGH (ref 3.8–10.5)

## 2025-06-02 PROCEDURE — 36415 COLL VENOUS BLD VENIPUNCTURE: CPT

## 2025-06-02 PROCEDURE — 96374 THER/PROPH/DIAG INJ IV PUSH: CPT

## 2025-06-02 PROCEDURE — 99285 EMERGENCY DEPT VISIT HI MDM: CPT | Mod: 25

## 2025-06-02 PROCEDURE — 85025 COMPLETE CBC W/AUTO DIFF WBC: CPT

## 2025-06-02 PROCEDURE — 73590 X-RAY EXAM OF LOWER LEG: CPT | Mod: 26,LT

## 2025-06-02 PROCEDURE — 93971 EXTREMITY STUDY: CPT | Mod: 26,LT

## 2025-06-02 PROCEDURE — 96375 TX/PRO/DX INJ NEW DRUG ADDON: CPT

## 2025-06-02 PROCEDURE — 99285 EMERGENCY DEPT VISIT HI MDM: CPT

## 2025-06-02 PROCEDURE — 93971 EXTREMITY STUDY: CPT

## 2025-06-02 PROCEDURE — 82550 ASSAY OF CK (CPK): CPT

## 2025-06-02 PROCEDURE — 80053 COMPREHEN METABOLIC PANEL: CPT

## 2025-06-02 PROCEDURE — 73590 X-RAY EXAM OF LOWER LEG: CPT

## 2025-06-02 RX ORDER — DOXYCYCLINE HYCLATE 100 MG
100 TABLET ORAL ONCE
Refills: 0 | Status: COMPLETED | OUTPATIENT
Start: 2025-06-02 | End: 2025-06-02

## 2025-06-02 RX ORDER — KETOROLAC TROMETHAMINE 30 MG/ML
15 INJECTION, SOLUTION INTRAMUSCULAR; INTRAVENOUS ONCE
Refills: 0 | Status: DISCONTINUED | OUTPATIENT
Start: 2025-06-02 | End: 2025-06-02

## 2025-06-02 RX ORDER — DOXYCYCLINE HYCLATE 100 MG
1 TABLET ORAL
Qty: 14 | Refills: 0
Start: 2025-06-02 | End: 2025-06-08

## 2025-06-02 RX ORDER — IBUPROFEN 200 MG
1 TABLET ORAL
Qty: 30 | Refills: 0
Start: 2025-06-02

## 2025-06-02 RX ADMIN — Medication 100 MILLIGRAM(S): at 21:31

## 2025-06-02 RX ADMIN — Medication 1000 MILLILITER(S): at 21:30

## 2025-06-02 RX ADMIN — KETOROLAC TROMETHAMINE 15 MILLIGRAM(S): 30 INJECTION, SOLUTION INTRAMUSCULAR; INTRAVENOUS at 21:30

## 2025-06-02 NOTE — ED ADULT TRIAGE NOTE - CHIEF COMPLAINT QUOTE
Left knee redness, swelling x 2 days. Denies CP, SOB, fever. Sent from  to r/o compartment syndrome.

## 2025-06-02 NOTE — ED PROVIDER NOTE - CLINICAL SUMMARY MEDICAL DECISION MAKING FREE TEXT BOX
patient with exam consistent with cellulitis.  No concern for compartment syndrome.  Duplex and x-ray unremarkable labs reveal leukocytosis.  Patient reassessed again prior to discharge the area of erythema has gotten smaller.  Home with antibiotics return precautions.  Patient advised to avoid direct sun exposure.

## 2025-06-02 NOTE — ED PROVIDER NOTE - NSFOLLOWUPINSTRUCTIONS_ED_ALL_ED_FT
return to the ED if having the redness spread having worsening pain    Cellulitis, Adult  A person's legs and feet. One leg is normal and the other leg is affected by cellulitis.  Cellulitis is a skin infection. The infected area is usually warm, red, swollen, and tender. It most commonly occurs on the lower body, such as the legs, feet, and toes, but this condition can occur on any part of the body. The infection can travel to the muscles, blood, and underlying tissue and become life-threatening without treatment. It is important to get medical treatment right away for this condition.    What are the causes?  Cellulitis is caused by bacteria. The bacteria enter through a break in the skin, such as a cut, burn, insect or animal bite, open sore, or crack.    What increases the risk?  This condition is more likely to occur in people who:  Have a weak body's defense system (immune system).  Are older than 60 years old.  Have diabetes.  Have a type of long-term (chronic) liver disease (cirrhosis) or kidney disease.  Are obese.  Have a skin condition such as:  An itchy rash, such as eczema or psoriasis.  A fungal rash on the feet or in skinfolds.  Blistering rashes, such as shingles or chickenpox.  Slow movement of blood in the veins (venous stasis).  Fluid buildup below the skin (edema).  Have open wounds on the skin, such as cuts, puncture wounds, burns, bites, scrapes, tattoos, piercings, or wounds from surgery.  Have had radiation therapy.  Use IV drugs.  What are the signs or symptoms?  Symptoms of this condition include:  Skin that looks red, purple, or slightly darker than your usual skin color.  Streaks or spots on the skin.  Swollen area of the skin.  Tenderness or pain when an area of the skin is touched.  Warm skin.  Fever or chills.  Blisters.  Tiredness (fatigue).  How is this diagnosed?  This condition is diagnosed based on a medical history and physical exam. You may also have tests, including:  Blood tests.  Imaging tests.  Tests on a sample of fluid taken from the wound (wound culture).  How is this treated?  Treatment for this condition may include:  Medicines. These may include antibiotics or medicines to treat allergies (antihistamines).  Rest.  Applying cold or warm wet cloths (compresses) to the skin.  If the condition is severe, you may need to stay in the hospital and get antibiotics through an IV.  The infection usually starts to get better within 1–2 days of treatment.    Follow these instructions at home:  Medicines    Take over-the-counter and prescription medicines only as told by your health care provider.  If you were prescribed antibiotics, take them as told by your provider. Do not stop using the antibiotic even if you start to feel better.  General instructions    Drink enough fluid to keep your pee (urine) pale yellow.  Do not touch or rub the infected area.  Raise (elevate) the infected area above the level of your heart while you are sitting or lying down.  Return to your normal activities as told by your provider. Ask your provider what activities are safe for you.  Apply warm or cold compresses to the affected area as told by your provider.  Keep all follow-up visits. Your provider will need to make sure that a more serious infection is not developing.  Contact a health care provider if:  You have a fever.  Your symptoms do not improve within 1–2 days of starting treatment or you develop new symptoms.  Your bone or joint underneath the infected area becomes painful after the skin has healed.  Your infection returns in the same area or another area. Signs of this may include:  You notice a swollen bump in the infected area.  Your red area gets larger, turns dark in color, or becomes more painful.  Drainage increases.  Pus or a bad smell develops in your infected area.  You have more pain.  You feel ill and have muscle aches and weakness.  You develop vomiting or diarrhea that will not go away.  Get help right away if:  You notice red streaks coming from the infected area.  You notice the skin turns purple or black and falls off.  This symptom may be an emergency. Get help right away. Call 911.  Do not wait to see if the symptom will go away.  Do not drive yourself to the hospital.  This information is not intended to replace advice given to you by your health care provider. Make sure you discuss any questions you have with your health care provider.    Document Revised: 08/15/2023 Document Reviewed: 08/15/2023  [employer] Patient Education © 2025 [employer]

## 2025-06-02 NOTE — ED PROVIDER NOTE - OBJECTIVE STATEMENT
39-year-old male presents with left leg redness and pain.  He reports that 3 days ago he was working on an engine of a motorcycle and fell and scraped his shin.  He was wearing pants at the time so the scrape occurred through his pants.  Then the following day he developed some redness proximal to the area which has spread today.  He notes pain in the area of redness.  He went to urgent care and was sent to ER to consider compartment syndrome.

## 2025-06-02 NOTE — ED PROVIDER NOTE - PATIENT PORTAL LINK FT
You can access the FollowMyHealth Patient Portal offered by [facility] by registering at the following website: [email or web address]. By joining [employer]’s FollowMyHealth portal, you will also be able to view your health information using other applications (apps) compatible with our system.

## 2025-06-02 NOTE — ED ADULT TRIAGE NOTE - WEIGHT METHOD
Spoke with patient, reminding her for repeat breast imaging in March. Patient states her insurance will not cover and plans on waiting until Aug. for repeat testing.   actual/standing

## 2025-06-02 NOTE — ED PROVIDER NOTE - PHYSICAL EXAMINATION
left leg: no calf swelling no tenderness.  Erythema and warmth in a well-circumscribed area to about 10 cm x 8 cm  To the proximal anterior tib-fib area.  The erythema is not circumferential..  Abrasion right below the area of erythema.  The erythema stops below the kneeand  is not crossing over the knee or the thigh area.  Foot is warm well-perfused with good capillary refill with no erythema. Distal tib-fib area also does not have any erythema.  2+ dorsalis pedis pulse.  No fluctuance.

## 2025-06-03 VITALS
OXYGEN SATURATION: 96 % | HEART RATE: 83 BPM | DIASTOLIC BLOOD PRESSURE: 77 MMHG | SYSTOLIC BLOOD PRESSURE: 133 MMHG | RESPIRATION RATE: 18 BRPM | TEMPERATURE: 99 F